# Patient Record
Sex: FEMALE | Race: WHITE
[De-identification: names, ages, dates, MRNs, and addresses within clinical notes are randomized per-mention and may not be internally consistent; named-entity substitution may affect disease eponyms.]

---

## 2019-11-15 ENCOUNTER — HOSPITAL ENCOUNTER (OUTPATIENT)
Dept: HOSPITAL 84 - D.NM | Age: 69
Discharge: HOME | End: 2019-11-15
Attending: FAMILY MEDICINE
Payer: MEDICARE

## 2019-11-15 VITALS — BODY MASS INDEX: 23.9 KG/M2

## 2019-11-15 DIAGNOSIS — Z12.31: ICD-10-CM

## 2019-11-15 DIAGNOSIS — R10.11: Primary | ICD-10-CM

## 2019-12-03 LAB
APTT BLD: 34.1 SECONDS (ref 22.8–39.4)
BASOPHILS NFR BLD AUTO: 0.3 % (ref 0–2)
EOSINOPHIL NFR BLD: 1.5 % (ref 0–7)
ERYTHROCYTE [DISTWIDTH] IN BLOOD BY AUTOMATED COUNT: 13.6 % (ref 11.5–14.5)
HCT VFR BLD CALC: 42.9 % (ref 36–48)
HGB BLD-MCNC: 14 G/DL (ref 12–16)
IMM GRANULOCYTES NFR BLD: 0.2 % (ref 0–5)
INR PPP: 0.99 (ref 0.85–1.17)
LYMPHOCYTES NFR BLD AUTO: 26.9 % (ref 15–50)
MCH RBC QN AUTO: 31 PG (ref 26–34)
MCHC RBC AUTO-ENTMCNC: 32.6 G/DL (ref 31–37)
MCV RBC: 94.9 FL (ref 80–100)
MONOCYTES NFR BLD: 5.6 % (ref 2–11)
NEUTROPHILS NFR BLD AUTO: 65.5 % (ref 40–80)
PLATELET # BLD: 196 10X3/UL (ref 130–400)
PMV BLD AUTO: 9.3 FL (ref 7.4–10.4)
PROTHROMBIN TIME: 12.6 SECONDS (ref 11.6–15)
RBC # BLD AUTO: 4.52 10X6/UL (ref 4–5.4)
WBC # BLD AUTO: 6.6 10X3/UL (ref 4.8–10.8)

## 2019-12-04 ENCOUNTER — HOSPITAL ENCOUNTER (OUTPATIENT)
Dept: HOSPITAL 84 - D.OPS | Age: 69
Discharge: HOME | End: 2019-12-04
Attending: SURGERY
Payer: MEDICARE

## 2019-12-04 VITALS — HEIGHT: 64 IN | BODY MASS INDEX: 23.73 KG/M2 | WEIGHT: 139 LBS | BODY MASS INDEX: 23.73 KG/M2

## 2019-12-04 VITALS — SYSTOLIC BLOOD PRESSURE: 136 MMHG | DIASTOLIC BLOOD PRESSURE: 612 MMHG

## 2019-12-04 DIAGNOSIS — R10.11: ICD-10-CM

## 2019-12-04 DIAGNOSIS — K82.8: Primary | ICD-10-CM

## 2019-12-09 ENCOUNTER — HOSPITAL ENCOUNTER (OUTPATIENT)
Dept: HOSPITAL 84 - D.MAMMO | Age: 69
Discharge: HOME | End: 2019-12-09
Attending: FAMILY MEDICINE
Payer: MEDICARE

## 2019-12-09 VITALS — BODY MASS INDEX: 23.9 KG/M2

## 2019-12-09 DIAGNOSIS — Z85.3: Primary | ICD-10-CM

## 2021-03-08 ENCOUNTER — HOSPITAL ENCOUNTER (OUTPATIENT)
Dept: HOSPITAL 84 - D.US | Age: 71
Discharge: HOME | End: 2021-03-08
Attending: NURSE PRACTITIONER
Payer: MEDICARE

## 2021-03-08 VITALS — BODY MASS INDEX: 24 KG/M2

## 2021-03-08 DIAGNOSIS — E04.1: Primary | ICD-10-CM

## 2021-03-08 LAB
T4 SERPL-MCNC: 11 UG/DL (ref 4.7–13.3)
TSH SERPL-ACNC: 1.57 UIU/ML (ref 0.36–3.74)

## 2021-04-05 ENCOUNTER — HOSPITAL ENCOUNTER (OUTPATIENT)
Dept: HOSPITAL 84 - D.OPS | Age: 71
LOS: 1 days | Discharge: HOME | End: 2021-04-06
Attending: SURGERY
Payer: MEDICARE

## 2021-04-05 VITALS
WEIGHT: 140.29 LBS | HEIGHT: 64 IN | BODY MASS INDEX: 23.95 KG/M2 | HEIGHT: 64 IN | WEIGHT: 140.29 LBS | BODY MASS INDEX: 23.95 KG/M2 | BODY MASS INDEX: 23.95 KG/M2

## 2021-04-05 VITALS — DIASTOLIC BLOOD PRESSURE: 49 MMHG | SYSTOLIC BLOOD PRESSURE: 120 MMHG

## 2021-04-05 VITALS — SYSTOLIC BLOOD PRESSURE: 134 MMHG | DIASTOLIC BLOOD PRESSURE: 55 MMHG

## 2021-04-05 VITALS — DIASTOLIC BLOOD PRESSURE: 98 MMHG | SYSTOLIC BLOOD PRESSURE: 156 MMHG

## 2021-04-05 VITALS — SYSTOLIC BLOOD PRESSURE: 126 MMHG | DIASTOLIC BLOOD PRESSURE: 58 MMHG

## 2021-04-05 VITALS — SYSTOLIC BLOOD PRESSURE: 119 MMHG | DIASTOLIC BLOOD PRESSURE: 62 MMHG

## 2021-04-05 VITALS — DIASTOLIC BLOOD PRESSURE: 54 MMHG | SYSTOLIC BLOOD PRESSURE: 127 MMHG

## 2021-04-05 VITALS — DIASTOLIC BLOOD PRESSURE: 50 MMHG | SYSTOLIC BLOOD PRESSURE: 126 MMHG

## 2021-04-05 VITALS — DIASTOLIC BLOOD PRESSURE: 56 MMHG | SYSTOLIC BLOOD PRESSURE: 126 MMHG

## 2021-04-05 DIAGNOSIS — J43.9: ICD-10-CM

## 2021-04-05 DIAGNOSIS — E04.1: Primary | ICD-10-CM

## 2021-04-05 DIAGNOSIS — R59.0: ICD-10-CM

## 2021-04-05 LAB
ANION GAP SERPL CALC-SCNC: 10.5 MMOL/L (ref 8–16)
BASOPHILS NFR BLD AUTO: 0.6 % (ref 0–2)
BUN SERPL-MCNC: 14 MG/DL (ref 7–18)
CALCIUM SERPL-MCNC: 8.9 MG/DL (ref 8.5–10.1)
CHLORIDE SERPL-SCNC: 104 MMOL/L (ref 98–107)
CO2 SERPL-SCNC: 31.1 MMOL/L (ref 21–32)
CREAT SERPL-MCNC: 0.8 MG/DL (ref 0.6–1.3)
EOSINOPHIL NFR BLD: 3.2 % (ref 0–7)
ERYTHROCYTE [DISTWIDTH] IN BLOOD BY AUTOMATED COUNT: 13.8 % (ref 11.5–14.5)
GLUCOSE SERPL-MCNC: 99 MG/DL (ref 74–106)
HCT VFR BLD CALC: 43.1 % (ref 36–48)
HGB BLD-MCNC: 13.9 G/DL (ref 12–16)
IMM GRANULOCYTES NFR BLD: 0 % (ref 0–5)
LYMPHOCYTES # BLD: 1.99 10X3/UL (ref 1.18–3.74)
LYMPHOCYTES NFR BLD AUTO: 37.1 % (ref 15–50)
MCH RBC QN AUTO: 30 PG (ref 26–34)
MCHC RBC AUTO-ENTMCNC: 32.3 G/DL (ref 31–37)
MCV RBC: 93.1 FL (ref 80–100)
MONOCYTES NFR BLD: 8.4 % (ref 2–11)
NEUTROPHILS # BLD: 2.72 10X3/UL (ref 1.56–6.13)
NEUTROPHILS NFR BLD AUTO: 50.7 % (ref 40–80)
OSMOLALITY SERPL CALC.SUM OF ELEC: 281 MOSM/KG (ref 275–300)
PLATELET # BLD: 217 10X3/UL (ref 130–400)
PMV BLD AUTO: 9.7 FL (ref 7.4–10.4)
POTASSIUM SERPL-SCNC: 4.6 MMOL/L (ref 3.5–5.1)
RBC # BLD AUTO: 4.63 10X6/UL (ref 4–5.4)
SODIUM SERPL-SCNC: 141 MMOL/L (ref 136–145)
WBC # BLD AUTO: 5.4 10X3/UL (ref 4.8–10.8)

## 2021-04-06 VITALS — DIASTOLIC BLOOD PRESSURE: 51 MMHG | SYSTOLIC BLOOD PRESSURE: 113 MMHG

## 2021-04-06 VITALS — SYSTOLIC BLOOD PRESSURE: 123 MMHG | DIASTOLIC BLOOD PRESSURE: 61 MMHG

## 2021-04-06 VITALS — SYSTOLIC BLOOD PRESSURE: 126 MMHG | DIASTOLIC BLOOD PRESSURE: 51 MMHG

## 2021-04-06 LAB
ALBUMIN SERPL-MCNC: 3.3 G/DL (ref 3.4–5)
ALP SERPL-CCNC: 72 U/L (ref 30–120)
ALT SERPL-CCNC: 19 U/L (ref 10–68)
ANION GAP SERPL CALC-SCNC: 9.9 MMOL/L (ref 8–16)
BASOPHILS NFR BLD AUTO: 0.1 % (ref 0–2)
BILIRUB SERPL-MCNC: 0.39 MG/DL (ref 0.2–1.3)
BUN SERPL-MCNC: 13 MG/DL (ref 7–18)
CALCIUM SERPL-MCNC: 8.1 MG/DL (ref 8.5–10.1)
CHLORIDE SERPL-SCNC: 101 MMOL/L (ref 98–107)
CO2 SERPL-SCNC: 29.5 MMOL/L (ref 21–32)
CREAT SERPL-MCNC: 0.9 MG/DL (ref 0.6–1.3)
EOSINOPHIL NFR BLD: 0.1 % (ref 0–7)
ERYTHROCYTE [DISTWIDTH] IN BLOOD BY AUTOMATED COUNT: 13.8 % (ref 11.5–14.5)
GLOBULIN SER-MCNC: 3.4 G/L
GLUCOSE SERPL-MCNC: 99 MG/DL (ref 74–106)
HCT VFR BLD CALC: 38.8 % (ref 36–48)
HGB BLD-MCNC: 12.4 G/DL (ref 12–16)
IMM GRANULOCYTES NFR BLD: 0.1 % (ref 0–5)
LYMPHOCYTES # BLD: 1.54 10X3/UL (ref 1.18–3.74)
LYMPHOCYTES NFR BLD AUTO: 16.6 % (ref 15–50)
MCH RBC QN AUTO: 29.8 PG (ref 26–34)
MCHC RBC AUTO-ENTMCNC: 32 G/DL (ref 31–37)
MCV RBC: 93.3 FL (ref 80–100)
MONOCYTES NFR BLD: 6.3 % (ref 2–11)
NEUTROPHILS # BLD: 7.13 10X3/UL (ref 1.56–6.13)
NEUTROPHILS NFR BLD AUTO: 76.8 % (ref 40–80)
OSMOLALITY SERPL CALC.SUM OF ELEC: 271 MOSM/KG (ref 275–300)
PLATELET # BLD: 214 10X3/UL (ref 130–400)
PMV BLD AUTO: 10.1 FL (ref 7.4–10.4)
POTASSIUM SERPL-SCNC: 4.4 MMOL/L (ref 3.5–5.1)
PROT SERPL-MCNC: 6.7 G/DL (ref 6.4–8.2)
RBC # BLD AUTO: 4.16 10X6/UL (ref 4–5.4)
SODIUM SERPL-SCNC: 136 MMOL/L (ref 136–145)
WBC # BLD AUTO: 9.3 10X3/UL (ref 4.8–10.8)

## 2021-04-06 NOTE — NUR
PATIENT HAS DENIED PAIN, HAS TOLERATED THE FULL LIQUID DIET, SHE IS CURRENTLY
RESTING IN BED WITH HER EYES CLOSED. ,[email or web address],DirectAddress_Unknown ,DirectAddress_Unknown,DirectAddress_Unknown,DirectAddress_Unknown ,DirectAddress_Unknown,DirectAddress_Unknown,DirectAddress_Unknown,[email or web address]

## 2021-04-06 NOTE — NUR
IV DCD WITH CATH TIP INTACT. DISCHARGE INSTRUCTIONS,STATES UNDERSTANDING. LEFT
UNIT VIA WHEELCHAIR FOR TRANSPORT.

## 2021-05-06 NOTE — OP
PATIENT NAME:  TAVARES HEAD                         MEDICAL RECORD: [id number]
:48                                             LOCATION:D.OPS          
                                                         ADMISSION DATE:        
SURGEON:  NAVI JETER MD            
 
 
DATE OF OPERATION:  2021
 
PREOPERATIVE DIAGNOSIS:  Thyroid nodules.
 
POSTOPERATIVE DIAGNOSIS:  Thyroid nodules.
 
PROCEDURE:  Total thyroidectomy.
 
SURGEON:  Srikanth Farah MD
 
ASSISTANT:  None.
 
BLOOD LOSS:  Minimal.
 
ANESTHESIA:  General.
 
COMPLICATIONS:  None.
 
The risks and possible complications and alternatives of the procedure were
explained to the patient.  She elected to proceed.  The discussion specifically
included, but was not limited to bleeding requiring emergency reoperation, the
alternative methods of diagnoses which could include a needle biopsy or nodule
ultrasound-guided aspiration.  The patient elected for thyroidectomy.
 
HOSPITAL COURSE:  The patient was conveyed to the operating room electively on
2021.  General anesthesia was induced by anesthesia staff.  The neck was
sterilely prepped and draped.  A transverse incision was accomplished, 2
fingerbreadths cephalad to the suprasternal notch.  Sharp dissection was carried
down through skin and subcutaneous tissue as well as the platysma.  Subplatysmal
flaps were created in a cephalad and caudad direction.  I noted the strap
muscles.  They were divided in the midline.  I first approached the right lobe
of the thyroid gland.  This was rolled medially and a tenaculum was placed. 
Dissection in the tracheoesophageal groove revealed the recurrent laryngeal
nerve, which was traced into the larynx.  I noted 1 parathyroid gland, which was
retracted for protection.  The superior pole vessels were sealed and divided
with the Harmonic scalpel.  Inferior pole vessels were sealed and divided with
the Harmonic scalpel.  I then dissected the thyroid gland off of the trachea and
the larynx with the Harmonic scalpel.  The right thyroid gland was sent to
pathology as a specimen.  I then approached the left lobe of the thyroid gland. 
The lobe was rotated medially with a tenaculum.  Dissection in the
tracheoesophageal groove revealed the recurrent laryngeal nerve, which was
traced into the larynx and was protected during the entire operation.  I
identified both parathyroid glands, which were retracted and protected during
the operation.  The superior pole vessels were sealed and divided with the
Harmonic scalpel.  The inferior pole vessels were sealed and cauterized with the
Harmonic scalpel.  I then dissected the left lobe of the thyroid gland off of
the larynx and trachea with the Harmonic scalpel.
 
The left lobe of the thyroid gland was sent to the pathologist.  A fibrin
sealant was added to both thyroid fossa for additional hemostasis.  There was no
bleeding.  The strap muscles were approximated in the midline with multiple
interrupted horizontal mattress 3-0 Vicryl.  The platysma was approximated with
 
 
 
OPERATIVE REPORT                               [id number]    TAVARES HEAD       
 
 
interrupted 3-0 Vicryl.  The skin was approximated with a running intracuticular
3-0 Vicryl.  Benzoin and Steri-Strips were applied and then a larger dressing.
 
The patient was then extubated and conveyed to post-anesthesia care unit where
she was in stable condition.  Tongue was midline.  She could say "E" easily. 
There was no shortness of breath.  No stridor.
 
TRANSINT:[id number] Voice Confirmation ID: [id number] DOCUMENT ID: [id number]
                                           
                                           NAVI JETER MD            
 
 
 
Electronically Signed by NAVI JETER on 21 at 1112
 
 
 
 
 
 
 
 
 
 
 
 
 
 
 
 
 
 
 
 
 
 
 
 
 
 
 
 
 
 
 
 
 
CC:                                                             [id number]
DICTATION DATE: 21 1602     :     21 0021      [facility] 
                                                                      21
[facility]                                          
[address] [place] [address]

## 2021-06-22 ENCOUNTER — HOSPITAL ENCOUNTER (OUTPATIENT)
Dept: HOSPITAL 84 - D.LAB | Age: 71
Discharge: HOME | End: 2021-06-22
Attending: NURSE PRACTITIONER
Payer: MEDICARE

## 2021-06-22 VITALS — BODY MASS INDEX: 24 KG/M2

## 2021-06-22 DIAGNOSIS — E04.1: Primary | ICD-10-CM

## 2021-06-22 LAB
T4 SERPL-MCNC: 9.1 UG/DL (ref 4.7–13.3)
TSH SERPL-ACNC: 87.58 UIU/ML (ref 0.36–3.74)